# Patient Record
Sex: FEMALE | Race: WHITE | URBAN - METROPOLITAN AREA
[De-identification: names, ages, dates, MRNs, and addresses within clinical notes are randomized per-mention and may not be internally consistent; named-entity substitution may affect disease eponyms.]

---

## 2022-08-09 ENCOUNTER — OFFICE VISIT (OUTPATIENT)
Dept: FAMILY MEDICINE CLINIC | Facility: CLINIC | Age: 62
End: 2022-08-09
Payer: COMMERCIAL

## 2022-08-09 VITALS
BODY MASS INDEX: 19.81 KG/M2 | OXYGEN SATURATION: 97 % | WEIGHT: 110.38 LBS | TEMPERATURE: 97 F | SYSTOLIC BLOOD PRESSURE: 116 MMHG | HEIGHT: 62.5 IN | RESPIRATION RATE: 16 BRPM | DIASTOLIC BLOOD PRESSURE: 80 MMHG | HEART RATE: 92 BPM

## 2022-08-09 DIAGNOSIS — I10 BENIGN ESSENTIAL HYPERTENSION: Primary | ICD-10-CM

## 2022-08-09 DIAGNOSIS — F17.200 TOBACCO DEPENDENCE: ICD-10-CM

## 2022-08-09 DIAGNOSIS — R26.81 UNSTEADY GAIT: ICD-10-CM

## 2022-08-09 DIAGNOSIS — R53.1 WEAKNESS: ICD-10-CM

## 2022-08-09 DIAGNOSIS — E78.5 HYPERLIPIDEMIA, UNSPECIFIED HYPERLIPIDEMIA TYPE: ICD-10-CM

## 2022-08-09 DIAGNOSIS — R01.1 HEART MURMUR, SYSTOLIC: ICD-10-CM

## 2022-08-09 DIAGNOSIS — R41.0 CONFUSION: ICD-10-CM

## 2022-08-09 DIAGNOSIS — Z12.31 ENCOUNTER FOR SCREENING MAMMOGRAM FOR MALIGNANT NEOPLASM OF BREAST: ICD-10-CM

## 2022-08-09 DIAGNOSIS — R93.0 ABNORMAL CT OF THE HEAD: ICD-10-CM

## 2022-08-09 PROCEDURE — 3074F SYST BP LT 130 MM HG: CPT | Performed by: FAMILY MEDICINE

## 2022-08-09 PROCEDURE — 3008F BODY MASS INDEX DOCD: CPT | Performed by: FAMILY MEDICINE

## 2022-08-09 PROCEDURE — 3079F DIAST BP 80-89 MM HG: CPT | Performed by: FAMILY MEDICINE

## 2022-08-09 PROCEDURE — 99203 OFFICE O/P NEW LOW 30 MIN: CPT | Performed by: FAMILY MEDICINE

## 2022-08-09 RX ORDER — SULFAMETHOXAZOLE AND TRIMETHOPRIM 800; 160 MG/1; MG/1
1 TABLET ORAL 2 TIMES DAILY
COMMUNITY
Start: 2022-07-27 | End: 2022-08-09 | Stop reason: ALTCHOICE

## 2022-08-09 RX ORDER — LISINOPRIL 10 MG/1
10 TABLET ORAL DAILY
Qty: 90 TABLET | Refills: 1 | Status: SHIPPED | OUTPATIENT
Start: 2022-08-09

## 2022-08-09 RX ORDER — LISINOPRIL 10 MG/1
TABLET ORAL
COMMUNITY
Start: 2022-08-06 | End: 2022-08-09

## 2022-08-10 ENCOUNTER — TELEPHONE (OUTPATIENT)
Dept: FAMILY MEDICINE CLINIC | Facility: CLINIC | Age: 62
End: 2022-08-10

## 2022-08-10 LAB
ABSOLUTE BASOPHILS: 11 CELLS/UL (ref 0–200)
ABSOLUTE EOSINOPHILS: 19 CELLS/UL (ref 15–500)
ABSOLUTE LYMPHOCYTES: 870 CELLS/UL (ref 850–3900)
ABSOLUTE MONOCYTES: 437 CELLS/UL (ref 200–950)
ABSOLUTE NEUTROPHILS: 2462 CELLS/UL (ref 1500–7800)
ALBUMIN/GLOBULIN RATIO: 1.7 (CALC) (ref 1–2.5)
ALBUMIN: 4 G/DL (ref 3.6–5.1)
ALKALINE PHOSPHATASE: 48 U/L (ref 37–153)
ALT: 14 U/L (ref 6–29)
AST: 17 U/L (ref 10–35)
BASOPHILS: 0.3 %
BILIRUBIN, TOTAL: 0.2 MG/DL (ref 0.2–1.2)
BUN/CREATININE RATIO: 38 (CALC) (ref 6–22)
BUN: 43 MG/DL (ref 7–25)
CALCIUM: 9.6 MG/DL (ref 8.6–10.4)
CARBON DIOXIDE: 26 MMOL/L (ref 20–32)
CHLORIDE: 104 MMOL/L (ref 98–110)
CHOL/HDLC RATIO: 3.7 (CALC)
CHOLESTEROL, TOTAL: 187 MG/DL
CREATININE: 1.13 MG/DL (ref 0.5–1.05)
EGFR: 55 ML/MIN/1.73M2
EOSINOPHILS: 0.5 %
GLOBULIN: 2.4 G/DL (CALC) (ref 1.9–3.7)
GLUCOSE: 121 MG/DL (ref 65–139)
HDL CHOLESTEROL: 50 MG/DL
HEMATOCRIT: 37.3 % (ref 35–45)
HEMOGLOBIN: 13.2 G/DL (ref 11.7–15.5)
LDL-CHOLESTEROL: 113 MG/DL (CALC)
LYMPHOCYTES: 22.9 %
MCH: 35 PG (ref 27–33)
MCHC: 35.4 G/DL (ref 32–36)
MCV: 98.9 FL (ref 80–100)
MONOCYTES: 11.5 %
MPV: 9.5 FL (ref 7.5–12.5)
NEUTROPHILS: 64.8 %
NON-HDL CHOLESTEROL: 137 MG/DL (CALC)
PLATELET COUNT: 252 THOUSAND/UL (ref 140–400)
POTASSIUM: 4.4 MMOL/L (ref 3.5–5.3)
PROTEIN, TOTAL: 6.4 G/DL (ref 6.1–8.1)
RDW: 11.8 % (ref 11–15)
RED BLOOD CELL COUNT: 3.77 MILLION/UL (ref 3.8–5.1)
SODIUM: 136 MMOL/L (ref 135–146)
TRIGLYCERIDES: 127 MG/DL
TSH W/REFLEX TO FT4: 1.9 MIU/L (ref 0.4–4.5)
WHITE BLOOD CELL COUNT: 3.8 THOUSAND/UL (ref 3.8–10.8)

## 2022-08-10 NOTE — TELEPHONE ENCOUNTER
Completed her FMLA paperwork. Placed in RN bin. Please fax into employer per pt request and inform when her copy can be picked up. Thanks.

## 2022-08-10 NOTE — TELEPHONE ENCOUNTER
Pt informed forms completed and faxed to [facility]. Patient wants forms mailed to her home address. Verified address for pt and states the listed is not up to date.  Would like forms mailed to [address] [id number]    Copy sent to scan

## 2022-08-15 ENCOUNTER — TELEPHONE (OUTPATIENT)
Dept: FAMILY MEDICINE CLINIC | Facility: CLINIC | Age: 62
End: 2022-08-15

## 2022-08-15 NOTE — TELEPHONE ENCOUNTER
----- Message from Anibal Murray MD sent at 8/11/2022 12:25 AM CDT -----  Please call pt to inform:  - labs overall look ok  - kidney function is slightly abnormal, but this pattern suggests she is dehydrated (drink more water)  - thyroid function is normal    Thanks.

## 2022-08-24 ENCOUNTER — HOSPITAL ENCOUNTER (OUTPATIENT)
Dept: CV DIAGNOSTICS | Facility: HOSPITAL | Age: 62
Discharge: HOME | End: 2022-08-24
Attending: FAMILY MEDICINE
Payer: COMMERCIAL

## 2022-08-24 DIAGNOSIS — R01.1 HEART MURMUR, SYSTOLIC: ICD-10-CM

## 2022-08-24 PROCEDURE — 93306 TTE W/DOPPLER COMPLETE: CPT | Performed by: FAMILY MEDICINE

## 2022-08-25 ENCOUNTER — HOSPITAL ENCOUNTER (OUTPATIENT)
Dept: MRI IMAGING | Facility: HOSPITAL | Age: 62
Discharge: HOME | End: 2022-08-25
Attending: FAMILY MEDICINE
Payer: COMMERCIAL

## 2022-08-25 DIAGNOSIS — R93.0 ABNORMAL CT OF THE HEAD: ICD-10-CM

## 2022-08-25 DIAGNOSIS — R41.0 CONFUSION: ICD-10-CM

## 2022-08-25 PROCEDURE — 70551 MRI BRAIN STEM W/O DYE: CPT | Performed by: FAMILY MEDICINE

## 2022-08-26 ENCOUNTER — TELEPHONE (OUTPATIENT)
Dept: FAMILY MEDICINE CLINIC | Facility: CLINIC | Age: 62
End: 2022-08-26

## 2022-08-27 DIAGNOSIS — R41.0 CONFUSION: ICD-10-CM

## 2022-08-27 DIAGNOSIS — I67.2 ATHEROSCLEROTIC CEREBROVASCULAR DISEASE: ICD-10-CM

## 2022-08-27 DIAGNOSIS — G93.89 MASS OF RIGHT FRONTAL LOBE: Primary | ICD-10-CM

## 2022-08-29 ENCOUNTER — OFFICE VISIT (OUTPATIENT)
Dept: FAMILY MEDICINE CLINIC | Facility: CLINIC | Age: 62
End: 2022-08-29
Payer: COMMERCIAL

## 2022-08-29 VITALS
OXYGEN SATURATION: 95 % | DIASTOLIC BLOOD PRESSURE: 78 MMHG | HEART RATE: 69 BPM | SYSTOLIC BLOOD PRESSURE: 120 MMHG | BODY MASS INDEX: 20 KG/M2 | TEMPERATURE: 98 F | RESPIRATION RATE: 16 BRPM | WEIGHT: 113.81 LBS

## 2022-08-29 DIAGNOSIS — R41.0 CONFUSION: ICD-10-CM

## 2022-08-29 DIAGNOSIS — R93.89 ABNORMAL MRI: Primary | ICD-10-CM

## 2022-08-29 DIAGNOSIS — I10 BENIGN ESSENTIAL HYPERTENSION: ICD-10-CM

## 2022-08-29 DIAGNOSIS — E78.2 MIXED HYPERLIPIDEMIA: ICD-10-CM

## 2022-08-29 PROCEDURE — 3074F SYST BP LT 130 MM HG: CPT | Performed by: FAMILY MEDICINE

## 2022-08-29 PROCEDURE — 3078F DIAST BP <80 MM HG: CPT | Performed by: FAMILY MEDICINE

## 2022-08-29 PROCEDURE — 99214 OFFICE O/P EST MOD 30 MIN: CPT | Performed by: FAMILY MEDICINE

## 2022-08-29 NOTE — TELEPHONE ENCOUNTER
----- Message from Nabeel Parrish MD sent at 8/27/2022 12:11 AM CDT -----  Please call pt to inform:  - an abnormality was seen in the same area of the brain (frontal lobe) as seen on previous MRI from when she was hospitlalized in [place] - but unclear if this is from an old stroke vs tumor. Radiology recommends repeat MRI, but with contrast this time. - she has atherosclerosis (hardening and possible blockage of the blood vessels) in the brain. Radiology is recommend a CT Angiogram to investigate further.  - please instruct pt on how to schedule the above recommended follow-up imaging tests. Orders are in her chart. Thanks.

## 2022-08-30 ENCOUNTER — TELEPHONE (OUTPATIENT)
Dept: FAMILY MEDICINE CLINIC | Facility: CLINIC | Age: 62
End: 2022-08-30

## 2022-08-30 NOTE — TELEPHONE ENCOUNTER
----- Message from Jimmy Mc MD sent at 8/24/2022  5:16 PM CDT -----  Please call pt to inform:   Ultrasound of the heart/ECHO was essentially normal. No significant abnormalities. Heart structures are intact and heart is pumping efficiently. No further testing is needed. Thanks.

## 2022-09-02 ENCOUNTER — TELEPHONE (OUTPATIENT)
Dept: FAMILY MEDICINE CLINIC | Facility: CLINIC | Age: 62
End: 2022-09-02

## 2022-09-06 NOTE — TELEPHONE ENCOUNTER
Spoke to Melissa Diehl, informed forms received via fax are for return to work restrictions. Melissa Diehl states those are the incorrect forms.  He was emailed the requested forms and will drop off in office for completion      Closing encounter

## 2022-09-07 ENCOUNTER — HOSPITAL ENCOUNTER (OUTPATIENT)
Dept: CT IMAGING | Facility: HOSPITAL | Age: 62
Discharge: HOME | End: 2022-09-07
Attending: FAMILY MEDICINE
Payer: COMMERCIAL

## 2022-09-07 DIAGNOSIS — I67.2 ATHEROSCLEROTIC CEREBROVASCULAR DISEASE: ICD-10-CM

## 2022-09-07 DIAGNOSIS — R41.0 CONFUSION: ICD-10-CM

## 2022-09-07 PROCEDURE — 70496 CT ANGIOGRAPHY HEAD: CPT | Performed by: FAMILY MEDICINE

## 2022-09-07 PROCEDURE — 70498 CT ANGIOGRAPHY NECK: CPT | Performed by: FAMILY MEDICINE

## 2022-09-07 RX ORDER — IOHEXOL 350 MG/ML
75 INJECTION, SOLUTION INTRAVENOUS
Status: COMPLETED | OUTPATIENT
Start: 2022-09-07 | End: 2022-09-07

## 2022-09-07 RX ADMIN — IOHEXOL 75 ML: 350 INJECTION, SOLUTION INTRAVENOUS at 18:40:00

## 2022-09-08 ENCOUNTER — HOSPITAL ENCOUNTER (OUTPATIENT)
Dept: MRI IMAGING | Facility: HOSPITAL | Age: 62
Discharge: HOME | End: 2022-09-08
Attending: FAMILY MEDICINE
Payer: COMMERCIAL

## 2022-09-08 DIAGNOSIS — G93.89 MASS OF RIGHT FRONTAL LOBE: ICD-10-CM

## 2022-09-08 PROCEDURE — 70553 MRI BRAIN STEM W/O & W/DYE: CPT | Performed by: FAMILY MEDICINE

## 2022-09-08 PROCEDURE — A9575 INJ GADOTERATE MEGLUMI 0.1ML: HCPCS | Performed by: FAMILY MEDICINE

## 2022-09-09 ENCOUNTER — TELEPHONE (OUTPATIENT)
Dept: FAMILY MEDICINE CLINIC | Facility: CLINIC | Age: 62
End: 2022-09-09

## 2022-09-09 ENCOUNTER — MED REC SCAN ONLY (OUTPATIENT)
Dept: FAMILY MEDICINE CLINIC | Facility: CLINIC | Age: 62
End: 2022-09-09

## 2022-09-09 NOTE — TELEPHONE ENCOUNTER
Spoke to son Sahara Palma per Roldan Loredo, informed son that MRI results are in and ready to be discussed at [facility] follow-up appointment. Sahara Palma v/u.      Future Appointments   Date Time Provider Alia Lizzie   9/12/2022  8:00 AM Felipe Monson MD [facility] [place]

## 2022-09-09 NOTE — TELEPHONE ENCOUNTER
Spoke to Tessie Bustos who is on [id number] pts son called office asking if pts MRI and imaging results are ready to discuss? Marcella henson has an appt for it on monday 09/12. Please call rachael back.

## 2022-09-09 NOTE — TELEPHONE ENCOUNTER
[facility] paperwork for extension is completed and signed. Please fax and inform pt and son when done. Thanks.

## 2022-09-09 NOTE — TELEPHONE ENCOUNTER
Spoke with Melissa Diehl, patient's son, per melecio, informed FMLA forms sent. Patient v/u and requested a paper copy for pickup at the office on Mondays appt.      Future Appointments   Date Time Provider Alia Samuel   9/12/2022  8:00 AM Randee Mcdonald MD [facility] [place]

## 2022-09-12 ENCOUNTER — OFFICE VISIT (OUTPATIENT)
Dept: FAMILY MEDICINE CLINIC | Facility: CLINIC | Age: 62
End: 2022-09-12
Payer: COMMERCIAL

## 2022-09-12 VITALS
RESPIRATION RATE: 16 BRPM | TEMPERATURE: 98 F | BODY MASS INDEX: 21 KG/M2 | OXYGEN SATURATION: 98 % | WEIGHT: 118.63 LBS | SYSTOLIC BLOOD PRESSURE: 130 MMHG | HEART RATE: 80 BPM | DIASTOLIC BLOOD PRESSURE: 80 MMHG

## 2022-09-12 DIAGNOSIS — K02.9 DENTAL CARIES: ICD-10-CM

## 2022-09-12 DIAGNOSIS — R93.89 ABNORMAL MRI: ICD-10-CM

## 2022-09-12 DIAGNOSIS — J43.9 PULMONARY EMPHYSEMA, UNSPECIFIED EMPHYSEMA TYPE (HCC): ICD-10-CM

## 2022-09-12 DIAGNOSIS — E78.2 MIXED HYPERLIPIDEMIA: ICD-10-CM

## 2022-09-12 DIAGNOSIS — I10 BENIGN ESSENTIAL HYPERTENSION: Primary | ICD-10-CM

## 2022-09-12 DIAGNOSIS — I67.1 RIGHT INTERNAL CAROTID ARTERY ANEURYSM: ICD-10-CM

## 2022-09-12 DIAGNOSIS — R22.1 MASS OF LATERAL NECK: ICD-10-CM

## 2022-09-12 PROCEDURE — 3075F SYST BP GE 130 - 139MM HG: CPT | Performed by: FAMILY MEDICINE

## 2022-09-12 PROCEDURE — 99214 OFFICE O/P EST MOD 30 MIN: CPT | Performed by: FAMILY MEDICINE

## 2022-09-12 PROCEDURE — 3079F DIAST BP 80-89 MM HG: CPT | Performed by: FAMILY MEDICINE

## 2022-09-16 ENCOUNTER — PATIENT MESSAGE (OUTPATIENT)
Dept: FAMILY MEDICINE CLINIC | Facility: CLINIC | Age: 62
End: 2022-09-16

## 2022-09-16 NOTE — TELEPHONE ENCOUNTER
From: Shannan Da Silva  To: Moraima Bradley MD  Sent: 9/16/2022 9:43 AM CDT  Subject: Short-Term Disability extension form    Here is another copy of the the short-term disability form. When we spoke to the doctor she suggested that my mom return to work at the end of October. The form that was submitted to the short-term disability provider on 09/09/2022 stated a return date of 10/01/2022. We are wondering what has changed.     Thanks,  Braxton Agee

## 2022-09-22 ENCOUNTER — TELEPHONE (OUTPATIENT)
Dept: FAMILY MEDICINE CLINIC | Facility: CLINIC | Age: 62
End: 2022-09-22

## 2022-09-26 NOTE — TELEPHONE ENCOUNTER
Patients son is calling about the short term papers not sure why it is saying October 1 when they talk doctor said November 1st would like someone to call them back

## 2022-09-27 NOTE — TELEPHONE ENCOUNTER
FMLA form completed on 09/09/2022 updated with a return date to work from 10/01/2022 to  11/01/2022 all other sections remain unchanged. Forms placed in provider bin for review and signature.   When completed form to be faxed to [phone]

## 2022-09-28 NOTE — TELEPHONE ENCOUNTER
Spoke to pt informed forms have been completed with return to work date 11/01/2022.    Forms faxed to [employer] [phone] confirmation received   Sent to scan and copy mailed to pt requesting it to be mailed to [facility] [address] [place] [id number]

## 2022-09-28 NOTE — TELEPHONE ENCOUNTER
Pt called office upset stating that it has been over a week and they have not received any of the paperwork

## 2022-10-18 ENCOUNTER — PATIENT MESSAGE (OUTPATIENT)
Dept: FAMILY MEDICINE CLINIC | Facility: CLINIC | Age: 62
End: 2022-10-18

## 2022-10-20 NOTE — TELEPHONE ENCOUNTER
Pt is expected to return to work in [place] 11/01/2022. Was seeing  for CVA and short term disability. Her last OV  Was 09/12/2022. Pt has since returned to [place] and plans on returning to work 11/01/2022. She denies need for physical limitations but would like a reduced work hour schedule.      Return to work certification is required, placed in provider bin for review and signature

## 2022-10-21 ENCOUNTER — TELEPHONE (OUTPATIENT)
Dept: FAMILY MEDICINE CLINIC | Facility: CLINIC | Age: 62
End: 2022-10-21

## 2022-10-21 ENCOUNTER — MED REC SCAN ONLY (OUTPATIENT)
Dept: FAMILY MEDICINE CLINIC | Facility: CLINIC | Age: 62
End: 2022-10-21

## 2022-10-21 NOTE — TELEPHONE ENCOUNTER
Spoke to pt informed forms have been completed with return to work date 11/01/2022. Forms faxed to [facility] [phone] confirmation received   Sent to scan and copy mailed to pt requesting it to be mailed to pt address.

## 2022-10-21 NOTE — TELEPHONE ENCOUNTER
Patient is pharmacy signed. Her office will try to fax it today. We have more than 4 days prior to 11/1/2022.   Please inform patient

## 2022-10-21 NOTE — TELEPHONE ENCOUNTER
Spoke to pt informed forms have been completed with return to work date 11/01/2022. Forms faxed to [employer] [phone] confirmation received   Sent to scan and copy mailed to pt requesting it to be mailed to pt address.

## 2025-07-07 NOTE — TELEPHONE ENCOUNTER
Dr. Cristofer Painter, please look at note below from [facility]. Form was placed in your bin. brain lesion resection

## 2025-07-22 ENCOUNTER — PATIENT OUTREACH (OUTPATIENT)
Dept: FAMILY MEDICINE CLINIC | Facility: CLINIC | Age: 65
End: 2025-07-22